# Patient Record
Sex: FEMALE | Race: BLACK OR AFRICAN AMERICAN | NOT HISPANIC OR LATINO | Employment: FULL TIME | URBAN - METROPOLITAN AREA
[De-identification: names, ages, dates, MRNs, and addresses within clinical notes are randomized per-mention and may not be internally consistent; named-entity substitution may affect disease eponyms.]

---

## 2021-02-26 PROBLEM — O02.1 IUFD AT LESS THAN 20 WEEKS OF GESTATION: Status: ACTIVE | Noted: 2021-02-26

## 2021-02-27 PROBLEM — O03.9 SAB (SPONTANEOUS ABORTION): Status: ACTIVE | Noted: 2021-02-27

## 2021-09-08 PROBLEM — O20.0 THREATENED ABORTION IN FIRST TRIMESTER: Status: ACTIVE | Noted: 2021-09-08

## 2021-09-16 ENCOUNTER — PATIENT MESSAGE (OUTPATIENT)
Dept: ADMINISTRATIVE | Facility: OTHER | Age: 26
End: 2021-09-16

## 2021-09-23 ENCOUNTER — PATIENT MESSAGE (OUTPATIENT)
Dept: ADMINISTRATIVE | Facility: OTHER | Age: 26
End: 2021-09-23

## 2021-10-11 PROBLEM — Z3A.14 14 WEEKS GESTATION OF PREGNANCY: Status: ACTIVE | Noted: 2021-10-11

## 2021-10-11 PROBLEM — Z3A.15 15 WEEKS GESTATION OF PREGNANCY: Status: ACTIVE | Noted: 2021-10-11

## 2021-10-11 PROBLEM — O99.210 OBESITY IN PREGNANCY: Status: ACTIVE | Noted: 2021-10-11

## 2021-10-11 PROBLEM — O03.9 SAB (SPONTANEOUS ABORTION): Status: RESOLVED | Noted: 2021-02-27 | Resolved: 2021-10-11

## 2021-10-11 PROBLEM — O09.92 HIGH-RISK PREGNANCY IN SECOND TRIMESTER: Status: ACTIVE | Noted: 2021-10-11

## 2021-10-11 PROBLEM — O20.0 THREATENED ABORTION IN FIRST TRIMESTER: Status: RESOLVED | Noted: 2021-09-08 | Resolved: 2021-10-11

## 2021-11-22 PROBLEM — O09.899 RUBELLA NON-IMMUNE STATUS, ANTEPARTUM: Status: ACTIVE | Noted: 2021-11-22

## 2021-11-22 PROBLEM — Z28.39 RUBELLA NON-IMMUNE STATUS, ANTEPARTUM: Status: ACTIVE | Noted: 2021-11-22

## 2021-11-22 PROBLEM — Z3A.20 20 WEEKS GESTATION OF PREGNANCY: Status: ACTIVE | Noted: 2021-10-11

## 2021-12-13 PROBLEM — R76.0 LUPUS ANTICOAGULANT POSITIVE: Status: ACTIVE | Noted: 2021-12-13

## 2021-12-13 PROBLEM — Z3A.23 23 WEEKS GESTATION OF PREGNANCY: Status: ACTIVE | Noted: 2021-10-11

## 2022-02-07 PROBLEM — Z3A.31 31 WEEKS GESTATION OF PREGNANCY: Status: ACTIVE | Noted: 2021-10-11

## 2022-02-07 PROBLEM — O09.93 HIGH-RISK PREGNANCY IN THIRD TRIMESTER: Status: ACTIVE | Noted: 2021-10-11

## 2022-02-08 PROBLEM — O36.60X0 LGA (LARGE FOR GESTATIONAL AGE) FETUS AFFECTING MANAGEMENT OF MOTHER: Status: ACTIVE | Noted: 2022-02-08

## 2022-03-04 ENCOUNTER — SOCIAL WORK (OUTPATIENT)
Dept: ADMINISTRATIVE | Facility: OTHER | Age: 27
End: 2022-03-04

## 2022-03-04 NOTE — PROGRESS NOTES
JERRY received message from  regarding a prior authorization for pt's medication (Lovenox). SW completed the demographic sheet on the prior authorization form and gave the form to  for review/signature. JERRY later received pt's completed prior authorization form from Cristofer Santiago and fax to([phone])[facility]. No other needs identified at the time.    Margoth CodyMSW  Pager#[phone]

## 2022-03-04 NOTE — PROGRESS NOTES
JERRY received approval letter from pt's insurance for medication(Enoxaparin) and approve for 6 months effective date:03/04/2022-08/31/2022. SW notify pt pharmacy([phone])regarding the approved. Sw made phone call to pt([phone])informed her medication has been approved and can  at the pharmacy. JERRY scanned the approval letter into epic. No other needs identified at this time.    Margoth Cody,MSW  Pager#[phone]

## 2022-03-11 PROBLEM — O16.3 ELEVATED BLOOD PRESSURE AFFECTING PREGNANCY IN THIRD TRIMESTER, ANTEPARTUM: Status: ACTIVE | Noted: 2022-03-11

## 2022-03-11 PROBLEM — O13.3 GESTATIONAL HYPERTENSION, THIRD TRIMESTER: Status: ACTIVE | Noted: 2022-03-11

## 2022-03-11 PROBLEM — Z3A.36 36 WEEKS GESTATION OF PREGNANCY: Status: ACTIVE | Noted: 2021-10-11

## 2022-03-14 PROBLEM — O99.820 GBS (GROUP B STREPTOCOCCUS CARRIER), +RV CULTURE, CURRENTLY PREGNANT: Status: ACTIVE | Noted: 2022-03-14

## 2022-03-17 PROBLEM — B00.9 HSV (HERPES SIMPLEX VIRUS) INFECTION: Status: ACTIVE | Noted: 2022-03-17

## 2022-03-17 PROBLEM — Z3A.36 36 WEEKS GESTATION OF PREGNANCY: Status: RESOLVED | Noted: 2021-10-11 | Resolved: 2022-03-17

## 2022-05-02 PROBLEM — O99.820 GBS (GROUP B STREPTOCOCCUS CARRIER), +RV CULTURE, CURRENTLY PREGNANT: Status: RESOLVED | Noted: 2022-03-14 | Resolved: 2022-05-02

## 2022-05-02 PROBLEM — O09.93 HIGH-RISK PREGNANCY IN THIRD TRIMESTER: Status: RESOLVED | Noted: 2021-10-11 | Resolved: 2022-05-02

## 2022-05-02 PROBLEM — O36.60X0 LGA (LARGE FOR GESTATIONAL AGE) FETUS AFFECTING MANAGEMENT OF MOTHER: Status: RESOLVED | Noted: 2022-02-08 | Resolved: 2022-05-02

## 2022-05-18 PROBLEM — N83.209 OVARIAN CYSTIC MASS: Status: ACTIVE | Noted: 2022-05-18

## 2022-05-18 PROBLEM — T30.0 BURN: Status: ACTIVE | Noted: 2022-05-18
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
## 2022-05-19 PROBLEM — E88.09 HYPOALBUMINEMIA: Status: ACTIVE | Noted: 2022-05-19

## 2022-05-19 PROBLEM — E66.01 SEVERE OBESITY (BMI >= 40): Status: ACTIVE | Noted: 2022-05-19

## 2022-05-19 PROBLEM — R79.89 HIGH SERUM LACTATE: Status: ACTIVE | Noted: 2022-05-19

## 2022-05-19 PROBLEM — D64.9 ANEMIA: Status: ACTIVE | Noted: 2022-05-19

## 2022-05-19 PROBLEM — S02.2XXA NASAL FRACTURE: Status: ACTIVE | Noted: 2022-05-19

## 2022-05-19 PROBLEM — Z98.891 HX OF CESAREAN SECTION: Status: ACTIVE | Noted: 2022-05-19

## 2022-06-07 DIAGNOSIS — Z82.0 FAMILY HISTORY OF EPILEPSY: Primary | ICD-10-CM

## 2022-06-20 PROBLEM — O13.3 GESTATIONAL HYPERTENSION, THIRD TRIMESTER: Status: RESOLVED | Noted: 2022-03-11 | Resolved: 2022-06-20

## 2023-04-04 ENCOUNTER — PATIENT MESSAGE (OUTPATIENT)
Dept: RESEARCH | Facility: HOSPITAL | Age: 28
End: 2023-04-04